# Patient Record
Sex: MALE | Race: OTHER | HISPANIC OR LATINO | Employment: UNEMPLOYED | URBAN - METROPOLITAN AREA
[De-identification: names, ages, dates, MRNs, and addresses within clinical notes are randomized per-mention and may not be internally consistent; named-entity substitution may affect disease eponyms.]

---

## 2023-12-26 ENCOUNTER — APPOINTMENT (OUTPATIENT)
Dept: RADIOLOGY | Facility: HOSPITAL | Age: 28
End: 2023-12-26
Payer: COMMERCIAL

## 2023-12-26 ENCOUNTER — HOSPITAL ENCOUNTER (EMERGENCY)
Facility: HOSPITAL | Age: 28
Discharge: HOME | End: 2023-12-27
Attending: STUDENT IN AN ORGANIZED HEALTH CARE EDUCATION/TRAINING PROGRAM
Payer: COMMERCIAL

## 2023-12-26 DIAGNOSIS — S92.414A CLOSED NONDISPLACED FRACTURE OF PROXIMAL PHALANX OF RIGHT GREAT TOE, INITIAL ENCOUNTER: Primary | ICD-10-CM

## 2023-12-26 PROCEDURE — 2500000004 HC RX 250 GENERAL PHARMACY W/ HCPCS (ALT 636 FOR OP/ED): Performed by: STUDENT IN AN ORGANIZED HEALTH CARE EDUCATION/TRAINING PROGRAM

## 2023-12-26 PROCEDURE — 99283 EMERGENCY DEPT VISIT LOW MDM: CPT | Mod: 25

## 2023-12-26 PROCEDURE — 73630 X-RAY EXAM OF FOOT: CPT | Mod: RIGHT SIDE | Performed by: RADIOLOGY

## 2023-12-26 PROCEDURE — 73630 X-RAY EXAM OF FOOT: CPT | Mod: RT

## 2023-12-26 PROCEDURE — 99284 EMERGENCY DEPT VISIT MOD MDM: CPT | Mod: 25 | Performed by: STUDENT IN AN ORGANIZED HEALTH CARE EDUCATION/TRAINING PROGRAM

## 2023-12-26 PROCEDURE — 96372 THER/PROPH/DIAG INJ SC/IM: CPT

## 2023-12-26 PROCEDURE — 99284 EMERGENCY DEPT VISIT MOD MDM: CPT | Performed by: STUDENT IN AN ORGANIZED HEALTH CARE EDUCATION/TRAINING PROGRAM

## 2023-12-26 PROCEDURE — 28490 TREAT BIG TOE FRACTURE: CPT | Performed by: STUDENT IN AN ORGANIZED HEALTH CARE EDUCATION/TRAINING PROGRAM

## 2023-12-26 RX ORDER — KETOROLAC TROMETHAMINE 30 MG/ML
30 INJECTION, SOLUTION INTRAMUSCULAR; INTRAVENOUS ONCE
Status: COMPLETED | OUTPATIENT
Start: 2023-12-26 | End: 2023-12-26

## 2023-12-26 RX ADMIN — KETOROLAC TROMETHAMINE 30 MG: 30 INJECTION, SOLUTION INTRAMUSCULAR at 22:45

## 2023-12-26 ASSESSMENT — PAIN DESCRIPTION - PAIN TYPE: TYPE: ACUTE PAIN

## 2023-12-26 ASSESSMENT — COLUMBIA-SUICIDE SEVERITY RATING SCALE - C-SSRS
6. HAVE YOU EVER DONE ANYTHING, STARTED TO DO ANYTHING, OR PREPARED TO DO ANYTHING TO END YOUR LIFE?: NO
1. IN THE PAST MONTH, HAVE YOU WISHED YOU WERE DEAD OR WISHED YOU COULD GO TO SLEEP AND NOT WAKE UP?: NO
2. HAVE YOU ACTUALLY HAD ANY THOUGHTS OF KILLING YOURSELF?: NO

## 2023-12-26 ASSESSMENT — PAIN SCALES - GENERAL
PAINLEVEL_OUTOF10: 2
PAINLEVEL_OUTOF10: 1

## 2023-12-26 ASSESSMENT — LIFESTYLE VARIABLES
HAVE YOU EVER FELT YOU SHOULD CUT DOWN ON YOUR DRINKING: NO
REASON UNABLE TO ASSESS: NO
EVER HAD A DRINK FIRST THING IN THE MORNING TO STEADY YOUR NERVES TO GET RID OF A HANGOVER: NO
HAVE PEOPLE ANNOYED YOU BY CRITICIZING YOUR DRINKING: NO
EVER FELT BAD OR GUILTY ABOUT YOUR DRINKING: NO

## 2023-12-26 ASSESSMENT — PAIN DESCRIPTION - ORIENTATION: ORIENTATION: RIGHT

## 2023-12-26 ASSESSMENT — PAIN - FUNCTIONAL ASSESSMENT
PAIN_FUNCTIONAL_ASSESSMENT: 0-10
PAIN_FUNCTIONAL_ASSESSMENT: 0-10

## 2023-12-26 ASSESSMENT — PAIN DESCRIPTION - LOCATION: LOCATION: TOE (COMMENT WHICH ONE)

## 2023-12-26 ASSESSMENT — PAIN DESCRIPTION - DESCRIPTORS: DESCRIPTORS: ACHING

## 2023-12-27 VITALS
OXYGEN SATURATION: 98 % | TEMPERATURE: 98.2 F | HEIGHT: 73 IN | WEIGHT: 195 LBS | SYSTOLIC BLOOD PRESSURE: 130 MMHG | BODY MASS INDEX: 25.84 KG/M2 | RESPIRATION RATE: 16 BRPM | HEART RATE: 68 BPM | DIASTOLIC BLOOD PRESSURE: 77 MMHG

## 2023-12-27 NOTE — DISCHARGE INSTRUCTIONS
Take ibuprofen and Tylenol as needed for the pain.  Rest, ice, and elevate your foot.  Wear close toed shoes.  Tape your two toes together, with soft padding or gauze between the toes, for the next few weeks to prevent additional injury.  Please follow-up with podiatry.  Give their office a call to make an appointment.  Return to the emergency department for worsening pain, color changes of the toe, or you have any questions or concerns.

## 2023-12-27 NOTE — ED PROVIDER NOTES
HPI   Chief Complaint   Patient presents with    Toe Injury     Pt states that he stumped his R great toe on wood and is unsure if it is broken or not. Pt states that toe pain is only bad with movements. No areas of broken skin and no noticeable bruising, per pt       Patient is an otherwise healthy 28-year-old male presents to the emergency department for right big toe injury.  He had stubbed his toe on a wooden chest yesterday.  The toe hurts with movement and is becoming swollen and bruised.  He is concerned about a fracture.  He denies any numbness or loss of sensation of the foot or toe.  Denies any other injuries.  Did not have any open wounds.  He has not been taking anything for his pain.      History provided by:  Patient                      [place] Coma Scale Score: 15                  Patient History   Past Medical History:   Diagnosis Date    Contact with and (suspected) exposure to covid-19 07/14/2020    Suspected COVID-19 virus infection    Encounter for immunization 11/13/2019    Tetanus-diphtheria (Td) vaccination    Pain in unspecified ankle and joints of unspecified foot 11/06/2019    Ankle pain    Personal history of other diseases of the respiratory system 07/13/2020    History of sore throat    Personal history of other specified conditions 07/13/2020    History of fever    Sprain of unspecified ligament of left ankle, initial encounter 11/28/2019    Left ankle sprain     Past Surgical History:   Procedure Laterality Date    OTHER SURGICAL HISTORY  11/06/2019    No history of surgery     No family history on file.  Social History     Tobacco Use    Smoking status: Not on file    Smokeless tobacco: Not on file   Substance Use Topics    Alcohol use: Not on file    Drug use: Not on file       Physical Exam   ED Triage Vitals [12/26/23 2159]   Temp Heart Rate Resp BP   36.8 °C (98.2 °F) 78 18 113/64      SpO2 Temp Source Heart Rate Source Patient Position   99 % Temporal Monitor Sitting      BP  Location FiO2 (%)     Right arm --       Physical Exam  Vitals and nursing note reviewed.   Constitutional:       General: He is not in acute distress.     Appearance: He is well-developed.   HENT:      Head: Normocephalic and atraumatic.      Right Ear: External ear normal.      Left Ear: External ear normal.      Nose: Nose normal.      Mouth/Throat:      Mouth: Mucous membranes are moist.   Eyes:      General: No scleral icterus.     Conjunctiva/sclera: Conjunctivae normal.      Pupils: Pupils are equal, round, and reactive to light.   Cardiovascular:      Rate and Rhythm: Normal rate and regular rhythm.      Heart sounds: No murmur heard.  Pulmonary:      Effort: Pulmonary effort is normal. No respiratory distress.      Breath sounds: Normal breath sounds.   Abdominal:      Palpations: Abdomen is soft.      Tenderness: There is no abdominal tenderness.   Musculoskeletal:         General: Swelling, tenderness and signs of injury present.      Cervical back: Neck supple. No rigidity.      Comments: Great toe on his right foot is swollen and bruised around the proximal phalanx.  Tender to palpation, but no decrease in sensation.  Capillary refill of the nailbed is brisk.   Skin:     General: Skin is warm and dry.   Neurological:      General: No focal deficit present.      Mental Status: He is alert.   Psychiatric:         Mood and Affect: Mood normal.         ED Course & MDM   Diagnoses as of 12/27/23 0558   Closed nondisplaced fracture of proximal phalanx of right great toe, initial encounter       Medical Decision Making  Patient is a 28-year-old male who presents emergency department for right big toe injury.  On examination, there is bruising and swelling around the proximal phalanx.  Brisk capillary refill of the nailbed and sensation is intact.  No other injuries, no break in the skin.  Toradol administered with improvement in his pain.  X-rays obtained which shows a subtle linear lucency along the distal  aspect of the first proximal phalanx suggestive of a nondisplaced fracture, clinical correlation recommended.  This does correlate with the patient's presentation.  His toes are jihan taped and he is given podiatry follow-up.  He is instructed to take Tylenol and ibuprofen at home, as well as ice and elevate his leg.  Home care and return instructions discussed. Patient expressed understanding and agreement. Patient discharged in stable condition.    Enzo Maldonado DO, PGY-3  Emergency Medicine Resident    Amount and/or Complexity of Data Reviewed  Radiology: ordered and independent interpretation performed.        Procedure  Procedures     Enzo Maldonado DO  Resident  12/27/23 0601

## 2024-09-19 ENCOUNTER — APPOINTMENT (OUTPATIENT)
Dept: RADIOLOGY | Facility: HOSPITAL | Age: 29
End: 2024-09-19
Payer: MEDICAID

## 2024-09-19 ENCOUNTER — HOSPITAL ENCOUNTER (EMERGENCY)
Facility: HOSPITAL | Age: 29
Discharge: HOME | End: 2024-09-19
Payer: MEDICAID

## 2024-09-19 VITALS
DIASTOLIC BLOOD PRESSURE: 70 MMHG | OXYGEN SATURATION: 97 % | HEIGHT: 72 IN | TEMPERATURE: 97.2 F | SYSTOLIC BLOOD PRESSURE: 119 MMHG | RESPIRATION RATE: 20 BRPM | HEART RATE: 84 BPM | WEIGHT: 185 LBS | BODY MASS INDEX: 25.06 KG/M2

## 2024-09-19 DIAGNOSIS — S93.402A SPRAIN OF LEFT ANKLE, INITIAL ENCOUNTER: Primary | ICD-10-CM

## 2024-09-19 PROCEDURE — 73610 X-RAY EXAM OF ANKLE: CPT | Mod: LT

## 2024-09-19 PROCEDURE — 2500000001 HC RX 250 WO HCPCS SELF ADMINISTERED DRUGS (ALT 637 FOR MEDICARE OP)

## 2024-09-19 PROCEDURE — 99283 EMERGENCY DEPT VISIT LOW MDM: CPT

## 2024-09-19 PROCEDURE — 73610 X-RAY EXAM OF ANKLE: CPT | Mod: LEFT SIDE | Performed by: RADIOLOGY

## 2024-09-19 RX ORDER — NAPROXEN SODIUM 550 MG/1
550 TABLET ORAL
Qty: 14 TABLET | Refills: 0 | Status: SHIPPED | OUTPATIENT
Start: 2024-09-19 | End: 2024-09-26

## 2024-09-19 RX ORDER — NAPROXEN 250 MG/1
500 TABLET ORAL ONCE
Status: COMPLETED | OUTPATIENT
Start: 2024-09-19 | End: 2024-09-19

## 2024-09-19 RX ADMIN — NAPROXEN 500 MG: 250 TABLET ORAL at 17:06

## 2024-09-19 ASSESSMENT — LIFESTYLE VARIABLES
EVER FELT BAD OR GUILTY ABOUT YOUR DRINKING: NO
HAVE YOU EVER FELT YOU SHOULD CUT DOWN ON YOUR DRINKING: NO
HAVE PEOPLE ANNOYED YOU BY CRITICIZING YOUR DRINKING: NO
TOTAL SCORE: 0
EVER HAD A DRINK FIRST THING IN THE MORNING TO STEADY YOUR NERVES TO GET RID OF A HANGOVER: NO

## 2024-09-19 ASSESSMENT — PAIN - FUNCTIONAL ASSESSMENT: PAIN_FUNCTIONAL_ASSESSMENT: 0-10

## 2024-09-19 ASSESSMENT — COLUMBIA-SUICIDE SEVERITY RATING SCALE - C-SSRS
2. HAVE YOU ACTUALLY HAD ANY THOUGHTS OF KILLING YOURSELF?: NO
1. IN THE PAST MONTH, HAVE YOU WISHED YOU WERE DEAD OR WISHED YOU COULD GO TO SLEEP AND NOT WAKE UP?: NO
6. HAVE YOU EVER DONE ANYTHING, STARTED TO DO ANYTHING, OR PREPARED TO DO ANYTHING TO END YOUR LIFE?: NO

## 2024-09-19 ASSESSMENT — PAIN DESCRIPTION - ONSET: ONSET: SUDDEN

## 2024-09-19 ASSESSMENT — PAIN DESCRIPTION - PROGRESSION: CLINICAL_PROGRESSION: NOT CHANGED

## 2024-09-19 ASSESSMENT — PAIN DESCRIPTION - PAIN TYPE: TYPE: ACUTE PAIN

## 2024-09-19 ASSESSMENT — PAIN DESCRIPTION - DESCRIPTORS: DESCRIPTORS: ACHING

## 2024-09-19 ASSESSMENT — PAIN DESCRIPTION - FREQUENCY: FREQUENCY: CONSTANT/CONTINUOUS

## 2024-09-19 ASSESSMENT — PAIN SCALES - GENERAL
PAINLEVEL_OUTOF10: 3
PAINLEVEL_OUTOF10: 3

## 2024-09-19 ASSESSMENT — PAIN DESCRIPTION - ORIENTATION: ORIENTATION: LEFT

## 2024-09-19 ASSESSMENT — PAIN DESCRIPTION - LOCATION: LOCATION: ANKLE

## 2024-09-19 NOTE — Clinical Note
Alexey Arango was seen and treated in our emergency department on 9/19/2024.  He may return to work on 09/23/2024.  Please excuse him for missing work yesterday as well     If you have any questions or concerns, please don't hesitate to call.      Diallo Caraballo PA-C

## 2024-09-19 NOTE — ED PROVIDER NOTES
HPI   Chief Complaint   Patient presents with    Ankle Pain     Left ankle pain       History provided by: Patient    Limitations to history: None    CC: Ankle injury    HPI: 29-year-old male previously healthy presents emergency department to be eval for left ankle that occurred.  Possible currently twisted causing an inversion he reports pain and swelling over the lateral aspect.  Denies bruising.  He still has full range of motion denies numbness or tingling.  Has been taking anything for the pain and actually reports the pain improving over the last few days.  Just wanted to make sure nothing was broken.  Denies falling to the ground or hitting his head when this occurred.  Denies pain or injury elsewhere.  Denies all other systemic symptoms.    ROS: Negative unless mentioned in HPI    Medical Hx: Denies allergies.  Immunizations up-to-date.    Physical exam:    Constitutional: Patient is well-nourished and well-developed.  Sitting comfortably in the room and in no distress.  Oriented to person, place, time, and situation.    HEENT: Head is normocephalic, atraumatic. Patient's airway is patent.  Tympanic membranes are clear bilaterally.  Nasal mucosa clear.  Mouth with normal mucosa.  Throat is not erythematous and there are no oropharyngeal exudates, uvula is midline.  No obvious facial deformities.    Eyes: Clear bilaterally.  Pupils are equal round and reactive to light and accommodation.  Extraocular movements intact.      Cardiac: Regular rate, regular rhythm.  Heart sounds S1, S2.  No murmurs, rubs, or gallops.  PMI nondisplaced.  No JVD.    Respiratory: Regular respiratory rate and effort.  Breath sounds are clear and equal bilaterally, no adventitious lung sounds.  Patient is speaking in full sentences and is in no apparent respiratory distress. No use of accessory muscles.      Gastrointestinal: Abdomen is soft, nondistended, and nontender.  There are no obvious deformities.  No rebound tenderness or  guarding.  Bowel sounds are normal active.    Genitourinary: No CVA or flank tenderness.    Musculoskeletal:  patient has pain and swelling over the lateral malleolus of the left ankle.  No pain over the Achilles.  No proximal tibia or fibula pain.  No calf tenderness.  Negative Homans' sign.  Compartment is soft.  No obvious skin or bony deformities.  Patient has equal range of motion in all extremities and no strength deficiencies.  No back or neck tenderness.  Capillary refill less than 3 seconds.  Strong peripheral pulses.  No sensory deficits.    Neurological: Patient is alert and oriented.  No focal deficits.  5/5 strength in all extremities.  Cranial nerves II through XII intact. GCS15.     Skin: Skin is normal color for race and is warm, dry, and intact.  No evidence of trauma.  No lesions, rashes, bruising, jaundice, or masses.    Psych: Appropriate mood and affect.  No apparent risk to self or others.    Heme/lymph: No adenopathy, lymphadenopathy, or splenomegaly    Physical exam is otherwise negative unless stated above or in history of present illness.              Patient History   Past Medical History:   Diagnosis Date    Contact with and (suspected) exposure to covid-19 07/14/2020    Suspected COVID-19 virus infection    Encounter for immunization 11/13/2019    Tetanus-diphtheria (Td) vaccination    Pain in unspecified ankle and joints of unspecified foot 11/06/2019    Ankle pain    Personal history of other diseases of the respiratory system 07/13/2020    History of sore throat    Personal history of other specified conditions 07/13/2020    History of fever    Sprain of unspecified ligament of left ankle, initial encounter 11/28/2019    Left ankle sprain     Past Surgical History:   Procedure Laterality Date    OTHER SURGICAL HISTORY  11/06/2019    No history of surgery     No family history on file.  Social History     Tobacco Use    Smoking status: Not on file    Smokeless tobacco: Not on file    Substance Use Topics    Alcohol use: Not on file    Drug use: Not on file       Physical Exam   ED Triage Vitals   Temperature Heart Rate Respirations BP   09/19/24 1647 09/19/24 1650 09/19/24 1647 09/19/24 1647   36.2 °C (97.2 °F) 84 20 119/70      Pulse Ox Temp Source Heart Rate Source Patient Position   09/19/24 1647 09/19/24 1647 09/19/24 1647 09/19/24 1647   97 % Temporal Monitor Sitting      BP Location FiO2 (%)     09/19/24 1647 --     Right arm        Physical Exam      ED Course & MDM   Diagnoses as of 09/19/24 1810   Sprain of left ankle, initial encounter          Patient updated on plan for lab testing, IV insertion, radiology imaging, and medications to be administered while in the ER (if indicated). Patient updated on expected wait times for testing and results. Patient provided my name and told to ask any staff member for questions or concerns if they should arise. Electronic medical record reviewed.     MDM    Upon initial assessment, patient was healthy non-toxic appearing and in no apparent distress.     Patient presented to the emergency department with the chief complaint left ankle injury. patient has pain and swelling over the lateral malleolus of the left ankle.  No pain over the Achilles.  No proximal tibia or fibula pain.  No calf tenderness.  Negative Homans' sign.  Compartment is soft.  No obvious skin or bony deformities.  Patient has equal range of motion in all extremities and no strength deficiencies.  No back or neck tenderness.  Capillary refill less than 3 seconds.  Strong peripheral pulses.  No sensory deficits. On arrival to the emergency department, vital signs were within normal limits    Will obtain an x-ray of the patient's ankle for further evaluation and have the nursing staff apply an Ace wrap and give the patient naproxen.    X-ray shows a small effusion and swelling but no fracture or subluxation.  Likely experienced a sprain.  Discussed RICE treatment.  Offered the  patient crutches however he declined at this time.  Will follow-up with orthopedics and his primary care provider.  Given a note for work.  Will be discharged with Anaprox.  All questions and concerns addressed.  Reasons to return to ER discussed.  Patient verbalized understanding and agreement with the treatment plan and they remained hemodynamically stable in the ER.      This note was dictated using a speech recognition program.  While an attempt was made at proof-reading to minimize errors, minor errors in transcription may be present       No data recorded     [place] Coma Scale Score: 15 (09/19/24 1650 : Nish Awan RN)                           Medical Decision Making      Procedure  Procedures     Diallo Caraballo PA-C  09/19/24 1342

## 2025-03-21 ENCOUNTER — OFFICE VISIT (OUTPATIENT)
Dept: PRIMARY CARE | Facility: CLINIC | Age: 30
End: 2025-03-21
Payer: COMMERCIAL

## 2025-03-21 VITALS
HEIGHT: 73 IN | WEIGHT: 195.6 LBS | HEART RATE: 65 BPM | OXYGEN SATURATION: 97 % | BODY MASS INDEX: 25.92 KG/M2 | TEMPERATURE: 97.8 F

## 2025-03-21 DIAGNOSIS — H02.9 EYELID ABNORMALITY: ICD-10-CM

## 2025-03-21 DIAGNOSIS — R30.0 DYSURIA: Primary | ICD-10-CM

## 2025-03-21 DIAGNOSIS — Z00.00 ROUTINE GENERAL MEDICAL EXAMINATION AT A HEALTH CARE FACILITY: ICD-10-CM

## 2025-03-21 DIAGNOSIS — Z20.2 STD EXPOSURE: ICD-10-CM

## 2025-03-21 ASSESSMENT — PATIENT HEALTH QUESTIONNAIRE - PHQ9
2. FEELING DOWN, DEPRESSED OR HOPELESS: NOT AT ALL
SUM OF ALL RESPONSES TO PHQ9 QUESTIONS 1 AND 2: 0
1. LITTLE INTEREST OR PLEASURE IN DOING THINGS: NOT AT ALL

## 2025-03-21 NOTE — PROGRESS NOTES
Subjective   Patient ID: Alexey Arango is a 29 y.o. male who presents for Establish Care.  Having several issues going on  HPI    Patient presents in the office today to establish care. Patient had not been seen in a couple years and is here to reestablish care.     Patient is worried about Parkinson's disease since his mom had recently been diagnosed.  We diagnosed signs symptoms of that at length with him no signs of that    Patient is concerned because his wife has bacterial vaginitis, but no gonorrhea or chlamydia. He wants to know if this is a concern for STD for him. He denies any penile discharge or burning during urination. He also states that neither he nor his partner has been unfaithful.    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      Review of systems  ; Patient seen today for exam denies any problems with headaches or vision, denies any shortness of breath chest pain nausea or vomiting, no black stool no blood in the stool no heartburn type symptoms denies any problems with constipation or diarrhea, and no dysuria-type symptoms    The patient's allergies medications were reviewed with them today    The patient's social family and surgical history or also reviewed here today, along with her past medical history.     Objective     Alert and active in  no acute distress  HEENT Has a small cholesterol collection above the right eye. TMs clear oropharynx negative nares clear no drainage noted neck supple  With no adenopathy   Heart regular rate and rhythm without murmur and no carotid bruits  Lungs- clear to auscultation bilaterally, no wheeze or rhonchi noted  Thyroid -negative masses or nodularity  Abdomen- soft times four quadrants , bowel sounds positive no masses or organomegaly, negative tenderness guarding or rebound  skin -no lesions noted  -  "Penile exam is unremarkable, except for a scar on the frenulum.      Pulse 65   Temp 36.6 °C (97.8 °F) (Temporal)   Ht 1.854 m (6' 1\")   Wt 88.7 kg (195 lb 9.6 oz)   SpO2 97%   BMI 25.81 kg/m²     No Known Allergies    Assessment/Plan   Problem List Items Addressed This Visit    None  Visit Diagnoses       Dysuria    -  Primary    Relevant Orders    C. trachomatis / N. gonorrhoeae, Amplified, Urogenital    Routine general medical examination at a health care facility        Relevant Orders    CBC and Auto Differential    Comprehensive Metabolic Panel    Lipid Panel    BMI 25.0-25.9,adult        STD exposure        Eyelid abnormality                Labs have been ordered, she/he will have these performed and we will contact her/him with results.  (CBC, CMP, Lipid, STD)  Will test for STD, including chlamydia and gonorrhea, due to the patient’s concern about possible STD, as his wife has bacterial vaginitis.    Recommended using lubricant during sexual intercourse due to the scar noted on the penis during exam today.     No Parkinson's disease.     Recommended less beef, more chicken, fish, vegetables in diet due to concern of high cholesterol given the cholesterol collect above his right eye.     If anything worsens or changes please call us at once, follow up in the office as planned,       Scribe Attestation  By signing my name below, I, Arya Salgado   attest that this documentation has been prepared under the direction and in the presence of Valentin Mcghee DO.    All medical record entries made by the Scribe were at my direction and personally dictated by me.   I have reviewed the chart and agree that the record accurately reflects my personal performance of the history, physical exam, discussion, and plan.  "